# Patient Record
Sex: FEMALE | Race: WHITE | Employment: OTHER | URBAN - METROPOLITAN AREA
[De-identification: names, ages, dates, MRNs, and addresses within clinical notes are randomized per-mention and may not be internally consistent; named-entity substitution may affect disease eponyms.]

---

## 2017-03-08 ENCOUNTER — OFFICE VISIT (OUTPATIENT)
Dept: FAMILY MEDICINE CLINIC | Facility: CLINIC | Age: 57
End: 2017-03-08

## 2017-03-08 VITALS
HEART RATE: 92 BPM | DIASTOLIC BLOOD PRESSURE: 78 MMHG | BODY MASS INDEX: 24.93 KG/M2 | RESPIRATION RATE: 18 BRPM | WEIGHT: 147.81 LBS | HEIGHT: 64.5 IN | SYSTOLIC BLOOD PRESSURE: 110 MMHG | TEMPERATURE: 97 F

## 2017-03-08 DIAGNOSIS — J02.9 SORE THROAT: Primary | ICD-10-CM

## 2017-03-08 DIAGNOSIS — K12.0 ULCER APHTHOUS ORAL: ICD-10-CM

## 2017-03-08 LAB
CONTROL LINE PRESENT WITH A CLEAR BACKGROUND (YES/NO): YES YES/NO
STREP GRP A CUL-SCR: NEGATIVE

## 2017-03-08 PROCEDURE — 99213 OFFICE O/P EST LOW 20 MIN: CPT | Performed by: FAMILY MEDICINE

## 2017-03-08 PROCEDURE — 87081 CULTURE SCREEN ONLY: CPT | Performed by: FAMILY MEDICINE

## 2017-03-08 PROCEDURE — 87880 STREP A ASSAY W/OPTIC: CPT | Performed by: FAMILY MEDICINE

## 2017-03-08 RX ORDER — CHLORHEXIDINE GLUCONATE 0.12 MG/ML
15 RINSE ORAL 2 TIMES DAILY
COMMUNITY
End: 2017-09-12 | Stop reason: ALTCHOICE

## 2017-03-08 RX ORDER — CHOLECALCIFEROL (VITAMIN D3) 1250 MCG
50000 CAPSULE ORAL
COMMUNITY
End: 2017-05-01

## 2017-03-08 RX ORDER — CLOBETASOL PROPIONATE 0.5 MG/G
OINTMENT TOPICAL WEEKLY
COMMUNITY

## 2017-03-08 RX ORDER — METRONIDAZOLE 250 MG/1
250 TABLET ORAL 3 TIMES DAILY
COMMUNITY
End: 2017-03-15 | Stop reason: ALTCHOICE

## 2017-03-08 RX ORDER — FLUOXETINE 10 MG/1
20 TABLET, FILM COATED ORAL DAILY
COMMUNITY
End: 2017-03-15 | Stop reason: ALTCHOICE

## 2017-03-09 NOTE — PROGRESS NOTES
[facility] [place]  PROGRESS NOTE  Chief Complaint:   Patient presents with:  Sore Throat: Per dentist suggested to get strep test    HPI:   This is a 49year old female coming in for eval of throat  HPI Pt had gum surgery2/22- on karla Wilson Gluconate 0.12 % Mouth/Throat Solution Use as directed 15 mL in the mouth or throat 2 (two) times daily. Disp:  Rfl:    metRONIDAZOLE 250 MG Oral Tab Take 250 mg by mouth 3 (three) times daily.  Disp:  Rfl:       Counseling given: Not Answered       REVIEW MONITORING. RECHECK OR ANTIBIOTIC IF WORSENS OR POSITIVE THROAT CULTURE. There are no Patient Instructions on file for this visit.   Meds & Refills for this Visit:    No prescriptions requested or ordered in this encounter           Patient/Caregiver Ed

## 2017-03-10 ENCOUNTER — TELEPHONE (OUTPATIENT)
Dept: FAMILY MEDICINE CLINIC | Facility: CLINIC | Age: 57
End: 2017-03-10

## 2017-03-14 ENCOUNTER — TELEPHONE (OUTPATIENT)
Dept: FAMILY MEDICINE CLINIC | Facility: CLINIC | Age: 57
End: 2017-03-14

## 2017-03-14 NOTE — TELEPHONE ENCOUNTER
As above. Patient advised to be here 8:45 Wed 3/15 for PreOp Appt.   Mary Cloud, 03/14/2017, 11:16 AM

## 2017-03-15 ENCOUNTER — OFFICE VISIT (OUTPATIENT)
Dept: FAMILY MEDICINE CLINIC | Facility: CLINIC | Age: 57
End: 2017-03-15

## 2017-03-15 ENCOUNTER — LAB ENCOUNTER (OUTPATIENT)
Dept: LAB | Age: 57
End: 2017-03-15
Attending: FAMILY MEDICINE
Payer: COMMERCIAL

## 2017-03-15 ENCOUNTER — TELEPHONE (OUTPATIENT)
Dept: FAMILY MEDICINE CLINIC | Facility: CLINIC | Age: 57
End: 2017-03-15

## 2017-03-15 VITALS
HEIGHT: 64.5 IN | HEART RATE: 88 BPM | SYSTOLIC BLOOD PRESSURE: 144 MMHG | DIASTOLIC BLOOD PRESSURE: 88 MMHG | WEIGHT: 146.5 LBS | BODY MASS INDEX: 24.71 KG/M2 | RESPIRATION RATE: 16 BRPM | TEMPERATURE: 97 F

## 2017-03-15 DIAGNOSIS — K06.9 CHRONIC GUM DISEASE: ICD-10-CM

## 2017-03-15 DIAGNOSIS — E55.9 VITAMIN D DEFICIENCY: ICD-10-CM

## 2017-03-15 DIAGNOSIS — M81.0 OSTEOPOROSIS: ICD-10-CM

## 2017-03-15 DIAGNOSIS — F32.A DEPRESSION, UNSPECIFIED DEPRESSION TYPE: ICD-10-CM

## 2017-03-15 DIAGNOSIS — M67.40 MUCOID CYST OF JOINT: ICD-10-CM

## 2017-03-15 DIAGNOSIS — F41.1 ANXIETY STATE: ICD-10-CM

## 2017-03-15 DIAGNOSIS — Z01.818 PREOPERATIVE EXAMINATION: ICD-10-CM

## 2017-03-15 DIAGNOSIS — Z01.818 PREOPERATIVE EXAMINATION: Primary | ICD-10-CM

## 2017-03-15 LAB
ALBUMIN SERPL-MCNC: 3.8 G/DL (ref 3.5–4.8)
ALP LIVER SERPL-CCNC: 75 U/L (ref 41–108)
ALT SERPL-CCNC: 23 U/L (ref 14–54)
AST SERPL-CCNC: 14 U/L (ref 15–41)
BASOPHILS # BLD AUTO: 0.05 X10(3) UL (ref 0–0.1)
BASOPHILS NFR BLD AUTO: 1.1 %
BILIRUB SERPL-MCNC: 0.3 MG/DL (ref 0.1–2)
BUN BLD-MCNC: 17 MG/DL (ref 8–20)
CALCIUM BLD-MCNC: 9.4 MG/DL (ref 8.3–10.3)
CHLORIDE: 104 MMOL/L (ref 101–111)
CHOLEST SMN-MCNC: 216 MG/DL (ref ?–200)
CO2: 29 MMOL/L (ref 22–32)
CREAT BLD-MCNC: 0.88 MG/DL (ref 0.55–1.02)
EOSINOPHIL # BLD AUTO: 0 X10(3) UL (ref 0–0.3)
EOSINOPHIL NFR BLD AUTO: 0 %
ERYTHROCYTE [DISTWIDTH] IN BLOOD BY AUTOMATED COUNT: 14.3 % (ref 11.5–16)
GLUCOSE BLD-MCNC: 80 MG/DL (ref 70–99)
HCT VFR BLD AUTO: 43.1 % (ref 34–50)
HDLC SERPL-MCNC: 42 MG/DL (ref 45–?)
HDLC SERPL: 5.14 {RATIO} (ref ?–4.44)
HGB BLD-MCNC: 14.1 G/DL (ref 12–16)
IMMATURE GRANULOCYTE COUNT: 0.01 X10(3) UL (ref 0–1)
IMMATURE GRANULOCYTE RATIO %: 0.2 %
LDLC SERPL CALC-MCNC: 151 MG/DL (ref ?–130)
LYMPHOCYTES # BLD AUTO: 2.03 X10(3) UL (ref 0.9–4)
LYMPHOCYTES NFR BLD AUTO: 43 %
M PROTEIN MFR SERPL ELPH: 7.5 G/DL (ref 6.1–8.3)
MCH RBC QN AUTO: 29.6 PG (ref 27–33.2)
MCHC RBC AUTO-ENTMCNC: 32.7 G/DL (ref 31–37)
MCV RBC AUTO: 90.4 FL (ref 81–100)
MONOCYTES # BLD AUTO: 0.55 X10(3) UL (ref 0.1–0.6)
MONOCYTES NFR BLD AUTO: 11.7 %
NEUTROPHIL ABS PRELIM: 2.08 X10 (3) UL (ref 1.3–6.7)
NEUTROPHILS # BLD AUTO: 2.08 X10(3) UL (ref 1.3–6.7)
NEUTROPHILS NFR BLD AUTO: 44 %
NONHDLC SERPL-MCNC: 174 MG/DL (ref ?–130)
PLATELET # BLD AUTO: 366 10(3)UL (ref 150–450)
POTASSIUM SERPL-SCNC: 4.1 MMOL/L (ref 3.6–5.1)
RBC # BLD AUTO: 4.77 X10(6)UL (ref 3.8–5.1)
RED CELL DISTRIBUTION WIDTH-SD: 47.5 FL (ref 35.1–46.3)
SODIUM SERPL-SCNC: 142 MMOL/L (ref 136–144)
TRIGLYCERIDES: 117 MG/DL (ref ?–150)
TSI SER-ACNC: 1.47 MIU/ML (ref 0.35–5.5)
URIC ACID: 3.5 MG/DL (ref 2.4–8)
VLDL: 23 MG/DL (ref 5–40)
WBC # BLD AUTO: 4.7 X10(3) UL (ref 4–13)

## 2017-03-15 PROCEDURE — 84443 ASSAY THYROID STIM HORMONE: CPT

## 2017-03-15 PROCEDURE — 84550 ASSAY OF BLOOD/URIC ACID: CPT

## 2017-03-15 PROCEDURE — 93000 ELECTROCARDIOGRAM COMPLETE: CPT | Performed by: FAMILY MEDICINE

## 2017-03-15 PROCEDURE — 80053 COMPREHEN METABOLIC PANEL: CPT

## 2017-03-15 PROCEDURE — 80061 LIPID PANEL: CPT

## 2017-03-15 PROCEDURE — 85025 COMPLETE CBC W/AUTO DIFF WBC: CPT

## 2017-03-15 PROCEDURE — 99214 OFFICE O/P EST MOD 30 MIN: CPT | Performed by: FAMILY MEDICINE

## 2017-03-15 RX ORDER — ESTRADIOL 0.1 MG/G
1 CREAM VAGINAL AS DIRECTED
COMMUNITY

## 2017-03-15 RX ORDER — FLUOXETINE HYDROCHLORIDE 20 MG/1
1 CAPSULE ORAL DAILY
COMMUNITY
End: 2017-09-12

## 2017-03-15 NOTE — TELEPHONE ENCOUNTER
Patient informed of Dr Sue Russell documentation below. Expressed understanding.   Philip Quintana, 03/15/2017, 6:44 PM

## 2017-03-15 NOTE — H&P
[facility] [place]  PRE-OP NOTE    HPI:   Melanie Palencia is a 49year old female with a hx of mucoid cyst, who presents for a pre-operative physical exam. Patient is to have excision of the mucoid cyst, to by done by Dr. Casilda Halsted at [facility] daily. Disp:  Rfl:      Past Medical History   Diagnosis Date   • Allergic rhinitis    • Anxiety    • Depression    • Menopausal and female climacteric states    • Frequent UTI    • Esophageal atresia          Past Surgical History          COLONOSCOPY enlarged nodes or history of splenectomy. PSYCHIATRIC: She is a long-standing history of depression. She is doing relatively well at present but wonders about restarting her fluoxetine.   ENDOCRINOLOGIC:  Denies excessive sweating, cold or heat intoleranc intact.     ASSESSMENT AND PLAN:   Ashley Curtis is a 49year old female, with a hx of mucoid cyst, who presents for a pre-operative  physical exam. Patient is to have removal of the mucoid cyst, to by done by Dr. Adán Melara at [place] [address] on 3

## 2017-03-15 NOTE — TELEPHONE ENCOUNTER
----- Message from Madeline Fournier MD sent at 3/15/2017  5:58 PM CDT -----  Good news - labs look good. Cholesterol is mildly elevated at 216; watch diet carefully.   Chemistry profile is normal.  Thyroid is normal.  Blood count is normal.  OK for surger

## 2017-03-20 ENCOUNTER — TELEPHONE (OUTPATIENT)
Dept: FAMILY MEDICINE CLINIC | Facility: CLINIC | Age: 57
End: 2017-03-20

## 2017-03-20 ENCOUNTER — MED REC SCAN ONLY (OUTPATIENT)
Dept: FAMILY MEDICINE CLINIC | Facility: CLINIC | Age: 57
End: 2017-03-20

## 2017-05-01 ENCOUNTER — PATIENT MESSAGE (OUTPATIENT)
Dept: FAMILY MEDICINE CLINIC | Facility: CLINIC | Age: 57
End: 2017-05-01

## 2017-05-01 RX ORDER — CHOLECALCIFEROL (VITAMIN D3) 1250 MCG
50000 CAPSULE ORAL
Qty: 7 CAPSULE | Refills: 3 | Status: SHIPPED | OUTPATIENT
Start: 2017-05-01 | End: 2018-06-06

## 2017-05-01 NOTE — TELEPHONE ENCOUNTER
----- Message from [address] [place] [address] [address] Generic sent at 5/1/2017 10:46 AM CDT -----  Regarding: Prescription Question  Contact: [phone]  I need a refill on my VIT D 50,000 IU D2 CAPS Rx. Please use [facility] in [place] [phone]. Thank you!

## 2017-09-12 ENCOUNTER — APPOINTMENT (OUTPATIENT)
Dept: LAB | Age: 57
End: 2017-09-12
Attending: FAMILY MEDICINE
Payer: COMMERCIAL

## 2017-09-12 ENCOUNTER — OFFICE VISIT (OUTPATIENT)
Dept: FAMILY MEDICINE CLINIC | Facility: CLINIC | Age: 57
End: 2017-09-12

## 2017-09-12 VITALS
SYSTOLIC BLOOD PRESSURE: 138 MMHG | RESPIRATION RATE: 14 BRPM | TEMPERATURE: 97 F | BODY MASS INDEX: 23.95 KG/M2 | HEIGHT: 64 IN | WEIGHT: 140.25 LBS | DIASTOLIC BLOOD PRESSURE: 82 MMHG | HEART RATE: 68 BPM

## 2017-09-12 DIAGNOSIS — Z00.00 HEALTHCARE MAINTENANCE: Primary | ICD-10-CM

## 2017-09-12 DIAGNOSIS — K58.2 IRRITABLE BOWEL SYNDROME WITH BOTH CONSTIPATION AND DIARRHEA: ICD-10-CM

## 2017-09-12 DIAGNOSIS — B00.9 HERPES SIMPLEX: ICD-10-CM

## 2017-09-12 DIAGNOSIS — F41.9 ANXIETY: ICD-10-CM

## 2017-09-12 DIAGNOSIS — F32.A DEPRESSION, UNSPECIFIED DEPRESSION TYPE: ICD-10-CM

## 2017-09-12 DIAGNOSIS — N90.4 LICHEN SCLEROSUS OF FEMALE GENITALIA: ICD-10-CM

## 2017-09-12 LAB
ALBUMIN SERPL-MCNC: 3.9 G/DL (ref 3.5–4.8)
ALP LIVER SERPL-CCNC: 71 U/L (ref 41–108)
ALT SERPL-CCNC: 20 U/L (ref 14–54)
AST SERPL-CCNC: 14 U/L (ref 15–41)
BASOPHILS # BLD AUTO: 0.05 X10(3) UL (ref 0–0.1)
BASOPHILS NFR BLD AUTO: 1.3 %
BILIRUB SERPL-MCNC: 0.7 MG/DL (ref 0.1–2)
BUN BLD-MCNC: 13 MG/DL (ref 8–20)
CALCIUM BLD-MCNC: 9 MG/DL (ref 8.3–10.3)
CHLORIDE: 105 MMOL/L (ref 101–111)
CHOLEST SMN-MCNC: 215 MG/DL (ref ?–200)
CO2: 29 MMOL/L (ref 22–32)
CREAT BLD-MCNC: 0.83 MG/DL (ref 0.55–1.02)
EOSINOPHIL # BLD AUTO: 0 X10(3) UL (ref 0–0.3)
EOSINOPHIL NFR BLD AUTO: 0 %
ERYTHROCYTE [DISTWIDTH] IN BLOOD BY AUTOMATED COUNT: 14.9 % (ref 11.5–16)
GLUCOSE BLD-MCNC: 86 MG/DL (ref 70–99)
HCT VFR BLD AUTO: 44.4 % (ref 34–50)
HDLC SERPL-MCNC: 54 MG/DL (ref 45–?)
HDLC SERPL: 3.98 {RATIO} (ref ?–4.44)
HGB BLD-MCNC: 14.3 G/DL (ref 12–16)
IMMATURE GRANULOCYTE COUNT: 0.01 X10(3) UL (ref 0–1)
IMMATURE GRANULOCYTE RATIO %: 0.3 %
LDLC SERPL CALC-MCNC: 143 MG/DL (ref ?–130)
LDLC SERPL-MCNC: 18 MG/DL (ref 5–40)
LYMPHOCYTES # BLD AUTO: 1.64 X10(3) UL (ref 0.9–4)
LYMPHOCYTES NFR BLD AUTO: 41.7 %
M PROTEIN MFR SERPL ELPH: 7.5 G/DL (ref 6.1–8.3)
MCH RBC QN AUTO: 28.8 PG (ref 27–33.2)
MCHC RBC AUTO-ENTMCNC: 32.2 G/DL (ref 31–37)
MCV RBC AUTO: 89.5 FL (ref 81–100)
MONOCYTES # BLD AUTO: 0.56 X10(3) UL (ref 0.1–0.6)
MONOCYTES NFR BLD AUTO: 14.2 %
NEUTROPHIL ABS PRELIM: 1.67 X10 (3) UL (ref 1.3–6.7)
NEUTROPHILS # BLD AUTO: 1.67 X10(3) UL (ref 1.3–6.7)
NEUTROPHILS NFR BLD AUTO: 42.5 %
NONHDLC SERPL-MCNC: 161 MG/DL (ref ?–130)
PLATELET # BLD AUTO: 293 10(3)UL (ref 150–450)
POTASSIUM SERPL-SCNC: 4 MMOL/L (ref 3.6–5.1)
RBC # BLD AUTO: 4.96 X10(6)UL (ref 3.8–5.1)
RED CELL DISTRIBUTION WIDTH-SD: 48.4 FL (ref 35.1–46.3)
SODIUM SERPL-SCNC: 140 MMOL/L (ref 136–144)
TRIGLYCERIDES: 92 MG/DL (ref ?–150)
TSI SER-ACNC: 1.15 MIU/ML (ref 0.35–5.5)
WBC # BLD AUTO: 3.9 X10(3) UL (ref 4–13)

## 2017-09-12 PROCEDURE — 80061 LIPID PANEL: CPT | Performed by: FAMILY MEDICINE

## 2017-09-12 PROCEDURE — 36415 COLL VENOUS BLD VENIPUNCTURE: CPT | Performed by: FAMILY MEDICINE

## 2017-09-12 PROCEDURE — 99396 PREV VISIT EST AGE 40-64: CPT | Performed by: FAMILY MEDICINE

## 2017-09-12 PROCEDURE — 99214 OFFICE O/P EST MOD 30 MIN: CPT | Performed by: FAMILY MEDICINE

## 2017-09-12 PROCEDURE — 80050 GENERAL HEALTH PANEL: CPT | Performed by: FAMILY MEDICINE

## 2017-09-12 RX ORDER — ACYCLOVIR 50 MG/G
OINTMENT TOPICAL
Qty: 15 G | Refills: 5 | Status: SHIPPED | OUTPATIENT
Start: 2017-09-12

## 2017-09-12 RX ORDER — DIAZEPAM 5 MG/1
5 TABLET ORAL EVERY 6 HOURS PRN
Qty: 10 TABLET | Refills: 1 | Status: SHIPPED | OUTPATIENT
Start: 2017-09-12 | End: 2018-10-05

## 2017-09-12 RX ORDER — FLUOXETINE HYDROCHLORIDE 20 MG/1
20 CAPSULE ORAL DAILY
Qty: 90 CAPSULE | Refills: 3 | Status: SHIPPED | OUTPATIENT
Start: 2017-09-12 | End: 2018-10-05

## 2017-09-12 NOTE — PROGRESS NOTES
[place] [facility] [place]  PROGRESS NOTE  Chief Complaint:   Patient presents with:  Physical: Sees gyne      HPI:   This is a 56year old female coming in for her annual physical.  She said that she has been feeling pretty good overall.   She denies an MCH 29.6 27.0 - 33.2 pg   MCHC 32.7 31.0 - 37.0 g/dL   RDW 14.3 11.5 - 16.0 %   RDW-SD 47.5 (H) 35.1 - 46.3 fL   Neutrophil Absolute Prelim 2.08 1.30 - 6.70 x10 (3) uL   Neutrophil Absolute 2.08 1.30 - 6.70 x10(3) uL   Lymphocyte Absolute 2.03 0.90 - 4. 0 Vaginal Cream Place 1 g vaginally As Directed. Insert 1g vaginally 2x Weekly Disp:  Rfl:    Clobetasol Propionate 0.05 % External Ointment Apply topically once a week.  Disp:  Rfl:       Counseling given: Not Answered       REVIEW OF SYSTEMS:   CONSTITUTION reviewed. Appears stated age, well groomed. Physical Exam:  GEN:  Patient is alert, awake and oriented, well developed, well nourished, no apparent distress.   HEENT:  Head:  Normocephalic, atraumatic Eyes: EOMI, PERRLA, no scleral icterus, conjunctivae jessica COMP METABOLIC PANEL (14); Future  - LIPID PANEL; Future  - ASSAY, THYROID STIM HORMONE; Future  - CBC WITH DIFFERENTIAL WITH PLATELET  - COMP METABOLIC PANEL (14)  - LIPID PANEL  - ASSAY, THYROID STIM HORMONE  - CBC W/ DIFFERENTIAL    4.  Irritable bowel s Hematochezia     Osteoporosis     Vitamin D deficiency     Preoperative examination     Mucoid cyst of joint     Chronic gum disease     Lichen sclerosus of female genitalia     Irritable bowel syndrome (IBS)     Herpes simplex     Healthcare maintenance

## 2017-10-11 ENCOUNTER — MED REC SCAN ONLY (OUTPATIENT)
Dept: FAMILY MEDICINE CLINIC | Facility: CLINIC | Age: 57
End: 2017-10-11

## 2018-06-07 NOTE — TELEPHONE ENCOUNTER
Future appt:     Last Appointment:  9/12/2017; Return in about 6 months (around 3/12/2018).        Cholesterol, Total (mg/dL)   Date Value   09/12/2017 215 (H)   ----------  HDL Cholesterol (mg/dL)   Date Value   09/12/2017 54   ----------  LDL Cholesterol

## 2018-06-08 RX ORDER — CHOLECALCIFEROL (VITAMIN D3) 1250 MCG
CAPSULE ORAL
Qty: 7 CAPSULE | Refills: 0 | Status: SHIPPED | OUTPATIENT
Start: 2018-06-08

## 2018-06-13 ENCOUNTER — MED REC SCAN ONLY (OUTPATIENT)
Dept: FAMILY MEDICINE CLINIC | Facility: CLINIC | Age: 58
End: 2018-06-13

## 2018-10-05 ENCOUNTER — OFFICE VISIT (OUTPATIENT)
Dept: FAMILY MEDICINE CLINIC | Facility: CLINIC | Age: 58
End: 2018-10-05
Payer: COMMERCIAL

## 2018-10-05 ENCOUNTER — APPOINTMENT (OUTPATIENT)
Dept: LAB | Age: 58
End: 2018-10-05
Attending: FAMILY MEDICINE
Payer: COMMERCIAL

## 2018-10-05 VITALS
RESPIRATION RATE: 16 BRPM | WEIGHT: 141.38 LBS | BODY MASS INDEX: 24.14 KG/M2 | HEART RATE: 70 BPM | TEMPERATURE: 96 F | SYSTOLIC BLOOD PRESSURE: 138 MMHG | HEIGHT: 64 IN | DIASTOLIC BLOOD PRESSURE: 98 MMHG

## 2018-10-05 DIAGNOSIS — N90.4 LICHEN SCLEROSUS OF FEMALE GENITALIA: ICD-10-CM

## 2018-10-05 DIAGNOSIS — F41.9 ANXIETY: ICD-10-CM

## 2018-10-05 DIAGNOSIS — F33.42 RECURRENT MAJOR DEPRESSIVE DISORDER, IN FULL REMISSION (HCC): ICD-10-CM

## 2018-10-05 DIAGNOSIS — K58.2 IRRITABLE BOWEL SYNDROME WITH BOTH CONSTIPATION AND DIARRHEA: ICD-10-CM

## 2018-10-05 DIAGNOSIS — E55.9 VITAMIN D DEFICIENCY: ICD-10-CM

## 2018-10-05 DIAGNOSIS — M81.0 AGE-RELATED OSTEOPOROSIS WITHOUT CURRENT PATHOLOGICAL FRACTURE: ICD-10-CM

## 2018-10-05 DIAGNOSIS — K59.09 CHRONIC CONSTIPATION: ICD-10-CM

## 2018-10-05 DIAGNOSIS — Z00.00 HEALTHCARE MAINTENANCE: Primary | ICD-10-CM

## 2018-10-05 PROCEDURE — 99213 OFFICE O/P EST LOW 20 MIN: CPT | Performed by: FAMILY MEDICINE

## 2018-10-05 PROCEDURE — 80061 LIPID PANEL: CPT | Performed by: FAMILY MEDICINE

## 2018-10-05 PROCEDURE — 36415 COLL VENOUS BLD VENIPUNCTURE: CPT | Performed by: FAMILY MEDICINE

## 2018-10-05 PROCEDURE — 80050 GENERAL HEALTH PANEL: CPT | Performed by: FAMILY MEDICINE

## 2018-10-05 PROCEDURE — 82306 VITAMIN D 25 HYDROXY: CPT | Performed by: FAMILY MEDICINE

## 2018-10-05 PROCEDURE — 84550 ASSAY OF BLOOD/URIC ACID: CPT | Performed by: FAMILY MEDICINE

## 2018-10-05 PROCEDURE — 99396 PREV VISIT EST AGE 40-64: CPT | Performed by: FAMILY MEDICINE

## 2018-10-05 RX ORDER — DIAZEPAM 5 MG/1
5 TABLET ORAL EVERY 6 HOURS PRN
Qty: 10 TABLET | Refills: 1 | Status: SHIPPED | OUTPATIENT
Start: 2018-10-05 | End: 2019-04-07

## 2018-10-05 NOTE — PROGRESS NOTES
[facility] [place]  PROGRESS NOTE  Chief Complaint:   Patient presents with:  Physical      HPI:   This is a 66year old female coming in for her annual wellness visit. She stopped taking her fluoxetine about 6 weeks ago.   She has done relati 140 136 - 144 mmol/L    Potassium 4.0 3.6 - 5.1 mmol/L    Chloride 105 101 - 111 mmol/L    CO2 29.0 22.0 - 32.0 mmol/L   LIPID PANEL   Result Value Ref Range    Cholesterol, Total 215 (H) <200 mg/dL    Triglycerides 92 <150 mg/dL    HDL Cholesterol 54 >45 Relation Age of Onset   • Lipids Mother    • Other (Other[other]) Mother      Allergies:    Venlafaxine             NAUSEA AND VOMITING  Penicillin G            RASH  Penicillins               Current Meds:    Current Outpatient Medications:  diazepam 5 MG intolerance, polyuria or polydipsia. ALLERGIES:  Denies allergic response, history of asthma, sneezing, hives, eczema or rhinitis.      EXAM:   BP (!) 138/98 (BP Location: Left arm, Patient Position: Sitting, Cuff Size: adult)   Pulse 70   Temp (!) 96.4 °F Age-related osteoporosis without current pathological fracture  She does have osteoporosis. It does run in her family. She is due for a DEXA scan. She will schedule that. - CBC WITH DIFFERENTIAL WITH PLATELET; Future  - COMP METABOLIC PANEL (14);  Futur scan and a mammogram.  Recheck in 1 year.     Meds & Refills for this Visit:  Requested Prescriptions     Signed Prescriptions Disp Refills   • diazepam 5 MG Oral Tab 10 tablet 1     Sig: Take 1 tablet (5 mg total) by mouth every 6 (six) hours as needed for

## 2018-10-06 ENCOUNTER — TELEPHONE (OUTPATIENT)
Dept: FAMILY MEDICINE CLINIC | Facility: CLINIC | Age: 58
End: 2018-10-06

## 2018-10-06 NOTE — TELEPHONE ENCOUNTER
----- Message from Leonidas Low MD sent at 10/6/2018  7:59 AM CDT -----  Good news. The blood work is completely normal.  Vitamin D level is 55 which is excellent. Please keep taking the vitamin D supplement.   Blood sugar is normal so no signs of di

## 2018-10-16 ENCOUNTER — TELEPHONE (OUTPATIENT)
Dept: FAMILY MEDICINE CLINIC | Facility: CLINIC | Age: 58
End: 2018-10-16

## 2018-10-16 DIAGNOSIS — M81.0 AGE-RELATED OSTEOPOROSIS WITHOUT CURRENT PATHOLOGICAL FRACTURE: ICD-10-CM

## 2018-10-16 DIAGNOSIS — E55.9 VITAMIN D DEFICIENCY: Primary | ICD-10-CM

## 2018-10-16 NOTE — TELEPHONE ENCOUNTER
Patient would like to schedule a bone density, needs orders. Patient scheduled a mammo on 10/19/18 at 10:00am if possible patient would like to schedule the bone density for that same day.

## 2018-10-19 ENCOUNTER — TELEPHONE (OUTPATIENT)
Dept: FAMILY MEDICINE CLINIC | Facility: CLINIC | Age: 58
End: 2018-10-19

## 2018-10-19 ENCOUNTER — HOSPITAL ENCOUNTER (OUTPATIENT)
Dept: BONE DENSITY | Age: 58
Discharge: HOME | End: 2018-10-19
Attending: FAMILY MEDICINE
Payer: COMMERCIAL

## 2018-10-19 ENCOUNTER — HOSPITAL ENCOUNTER (OUTPATIENT)
Dept: MAMMOGRAPHY | Age: 58
Discharge: HOME | End: 2018-10-19
Attending: FAMILY MEDICINE
Payer: COMMERCIAL

## 2018-10-19 DIAGNOSIS — M81.0 AGE-RELATED OSTEOPOROSIS WITHOUT CURRENT PATHOLOGICAL FRACTURE: ICD-10-CM

## 2018-10-19 DIAGNOSIS — Z12.39 BREAST CANCER SCREENING: ICD-10-CM

## 2018-10-19 DIAGNOSIS — E55.9 VITAMIN D DEFICIENCY: ICD-10-CM

## 2018-10-19 PROCEDURE — 77067 SCR MAMMO BI INCL CAD: CPT | Performed by: FAMILY MEDICINE

## 2018-10-19 PROCEDURE — 77080 DXA BONE DENSITY AXIAL: CPT | Performed by: FAMILY MEDICINE

## 2018-10-19 PROCEDURE — 77063 BREAST TOMOSYNTHESIS BI: CPT | Performed by: FAMILY MEDICINE

## 2018-10-19 NOTE — TELEPHONE ENCOUNTER
----- Message from Kaushal Reyes MD sent at 10/19/2018  4:28 PM CDT -----  Right breast is normal.  Left breast needs additional views. Please schedule an appointment for these additional views.

## 2018-10-19 NOTE — TELEPHONE ENCOUNTER
I spoke with Aldo Wolf. Her old record talked about Prolixin injectable. Apparently was ordered but she never actually took the injection. Her DEXA scores are slowly worsening.   She will do additional research and see if there is something else she would co

## 2018-10-21 ENCOUNTER — PATIENT MESSAGE (OUTPATIENT)
Dept: FAMILY MEDICINE CLINIC | Facility: CLINIC | Age: 58
End: 2018-10-21

## 2018-10-22 ENCOUNTER — TELEPHONE (OUTPATIENT)
Dept: FAMILY MEDICINE CLINIC | Facility: CLINIC | Age: 58
End: 2018-10-22

## 2018-10-22 DIAGNOSIS — R92.8 ABNORMAL MAMMOGRAM: Primary | ICD-10-CM

## 2018-10-22 NOTE — TELEPHONE ENCOUNTER
Pt does not want to do f/u mammo in [place]. Can pt have diagnotic mammo done at [facility]? Please place order for diagnotic mammo and US. Please advise. Pt's mailbox is full.

## 2018-10-22 NOTE — TELEPHONE ENCOUNTER
Patient states in 2015 She had an Abnormal Mammogram Left Breast Left Breast.  Additional Views were also done at [facility]. Patient now has the breast imaging from 2016-Present.   Patient is wanting to go to [facility] for current additiona

## 2018-10-22 NOTE — TELEPHONE ENCOUNTER
would like to speak with Dr Reynaldo Siddiqi about  Mammo   snafu. .       does not want to go to [place] / needs orders and last 3 yr Mammo reports for radiologist.

## 2018-10-22 NOTE — PROGRESS NOTES
Kellen Zhao was wrong. The additional views require the presence of the radiologist to look at the abnormal area and to direct the additional testing to a specific spot. Unfortunately we do not have the radiologist on site here.   As a result you do have to

## 2018-10-22 NOTE — TELEPHONE ENCOUNTER
From: Deepak Cable  To:  Rissa Campuzano MD  Sent: 10/21/2018 7:48 PM CDT  Subject: Visit Follow-up Question    After out discussion on Friday evening I called the [address] (Saturday Oct 20th) and tried to make an appointment for the addition

## 2018-10-23 RX ORDER — FLUOXETINE HYDROCHLORIDE 20 MG/1
CAPSULE ORAL
Qty: 90 CAPSULE | Refills: 3 | OUTPATIENT
Start: 2018-10-23

## 2018-10-23 NOTE — TELEPHONE ENCOUNTER
Please call patient and ask if she is still taking this medication or if she stopped it. If she is wanting to restart, we need to go to a lower dose.

## 2018-10-30 ENCOUNTER — TELEPHONE (OUTPATIENT)
Dept: FAMILY MEDICINE CLINIC | Facility: CLINIC | Age: 58
End: 2018-10-30

## 2018-10-30 DIAGNOSIS — N63.0 BREAST LUMP: Primary | ICD-10-CM

## 2018-10-30 NOTE — TELEPHONE ENCOUNTER
Aishwarya Mckenzie requesting Mammogram to say Diagnostic Mammogram vs Additional views faxed. Please advise.   Srinivasa Breen, 10/30/18, 2:17 PM

## 2018-11-01 ENCOUNTER — TELEPHONE (OUTPATIENT)
Dept: FAMILY MEDICINE CLINIC | Facility: CLINIC | Age: 58
End: 2018-11-01

## 2018-11-01 DIAGNOSIS — R92.8 ABNORMAL MAMMOGRAM OF LEFT BREAST: Primary | ICD-10-CM

## 2018-11-01 NOTE — TELEPHONE ENCOUNTER
Needs new order to bilateral diagnostic mammo, left U/S  Fax # [phone].  Having done tomorrow am at 8 am

## 2018-11-01 NOTE — TELEPHONE ENCOUNTER
Dr. Susanne Black is out of the office today. Orders signed. Please fax to [facility]. Thank you.

## 2018-11-01 NOTE — TELEPHONE ENCOUNTER
Per Hannah, they are needing 2 different orders:    1. Bilateral diagnostic mammogram (because patient hasn't been there in 2 years)  2. U/S L breast    Please advise.

## 2018-11-13 ENCOUNTER — MED REC SCAN ONLY (OUTPATIENT)
Dept: FAMILY MEDICINE CLINIC | Facility: CLINIC | Age: 58
End: 2018-11-13

## 2018-11-15 ENCOUNTER — TELEPHONE (OUTPATIENT)
Dept: FAMILY MEDICINE CLINIC | Facility: CLINIC | Age: 58
End: 2018-11-15

## 2018-11-16 NOTE — TELEPHONE ENCOUNTER
Patient had Additional Mammogram Views done at [facility] due to previous  Abnormal Mammogram Additional Views done there. [facility] had all the old images there. Patient did not want to repeat additional views with [facility].   Message sent t

## 2019-02-13 ENCOUNTER — TELEPHONE (OUTPATIENT)
Dept: FAMILY MEDICINE CLINIC | Facility: CLINIC | Age: 59
End: 2019-02-13

## 2019-02-13 NOTE — TELEPHONE ENCOUNTER
Patient states She is having increasing IBS Sx. Involving loose stools. Every other week or so. Patient taking an antispasmotic and imodium to help with sx. States she was on fluoxitine in the past that she has weaned herself from.   States this [employer]

## 2019-02-26 NOTE — PROGRESS NOTES
[facility] [place]  PROGRESS NOTE  Chief Complaint:   Patient presents with:  Irritable Bowel      HPI:   This is a 66year old female coming in for cramping and severe abdominal pain.     She said that since the middle of December her stress lev decreased her libido. Fluoxetine made her thinking slightly foggy. She felt like she was less able to tackle problems or issues when she was taking it. She has a bottle of hyoscyamine from a prescription back in 2014.   She has been taking those to hel - 450.0 10(3)uL    MCV 90.1 81.0 - 100.0 fL    MCH 28.8 27.0 - 33.2 pg    MCHC 31.9 31.0 - 37.0 g/dL    RDW 14.5 11.5 - 16.0 %    RDW-SD 48.1 (H) 35.1 - 46.3 fL    Neutrophil Absolute Prelim 2.83 1.30 - 6.70 x10 (3) uL    Neutrophil Absolute 2.83 1.30 - 6. hours as needed for Anxiety.  Disp: 10 tablet Rfl: 1   VITAMIN D3 01927 units Oral Cap TAKE 1 CAPSULE BY MOUTH EVERY 14 DAYS Disp: 7 capsule Rfl: 0   acyclovir 5 % External Ointment Apply to affected area five times a day Disp: 15 g Rfl: 5   Estradiol (ESTR 23.07 kg/m² as calculated from the following:    Height as of this encounter: 64\". Weight as of this encounter: 134 lb 6.4 oz. Vital signs reviewed. Appears stated age, well groomed.   Physical Exam:  GEN:  Patient is alert, awake and oriented, well and the anxiety will help with the irritable bowel. 5. Achalasia of esophagus  She has a history of achalasia of the esophagus. It is not causing her a lot of trouble now.       Meds & Refills for this Visit:  Requested Prescriptions     Signed Prescrip

## 2019-04-08 RX ORDER — DIAZEPAM 5 MG/1
TABLET ORAL
Qty: 10 TABLET | Refills: 0 | Status: SHIPPED
Start: 2019-04-08 | End: 2020-12-08

## 2019-04-08 NOTE — TELEPHONE ENCOUNTER
Future appt:     Your appointments     Date & Time Appointment Department [facility])    Apr 22, 2019  3:00 PM CDT Follow up with Napoleon Caceres MD [address] [facility], [facility]            [address] [facility]

## 2019-05-01 PROBLEM — F41.1 GAD (GENERALIZED ANXIETY DISORDER): Status: ACTIVE | Noted: 2019-05-01

## 2019-05-01 NOTE — PROGRESS NOTES
[facility] [place]  PROGRESS NOTE  Chief Complaint:   Patient presents with:  Medication Follow-Up: Pt stopped welbutrin      HPI:   This is a 66year old female coming in for follow-up on her depression.   She said she took the Wellbutrin as pre Total 0.6 0.1 - 2.0 mg/dL    Total Protein 7.3 6.4 - 8.2 g/dL    Albumin 3.8 3.1 - 4.5 g/dL    Globulin  3.5 2.8 - 4.4 g/dL    A/G Ratio 1.1 1.0 - 2.0   LIPID PANEL   Result Value Ref Range    Cholesterol, Total 202 (H) <200 mg/dL    HDL Cholesterol 50 40 • TONSILLECTOMY       Social History:  Social History    Tobacco Use      Smoking status: Never Smoker      Smokeless tobacco: Never Used    Alcohol use: No      Alcohol/week: 0.0 oz    Drug use: No    Family History:  Family History   Problem Relation A severe problems with her irritable bowel syndrome. It is very much stress related. She alternates between severe diarrhea and constipation. MUSCULOSKELETAL:  Denies weakness, muscle aches, back pain, joint pain, swelling or stiffness.   NEUROLOGICAL:  [employer] cyanosis, no clubbing, FROM, 2+ dorsalis pedis pulses bilaterally. ASSESSMENT AND PLAN:   1. Moderate episode of recurrent major depressive disorder (HCC)  Her depression is active and currently not being treated.   Plan: Start fluoxetine 10 mg p.o. yuliya MD  5/1/2019  4:58 PM

## 2019-09-30 RX ORDER — HYOSCYAMINE SULFATE 0.125 MG
0.12 TABLET,DISINTEGRATING ORAL EVERY 4 HOURS PRN
Qty: 30 TABLET | Refills: 0 | Status: SHIPPED | OUTPATIENT
Start: 2019-09-30 | End: 2020-08-19

## 2019-09-30 NOTE — TELEPHONE ENCOUNTER
Future appt:     Your appointments     Date & Time Appointment Department [facility])    Oct 22, 2019  2:30 PM CDT Physical - Established with Federico Triplett MD [address] [place] [address] [address]

## 2019-10-25 ENCOUNTER — LAB ENCOUNTER (OUTPATIENT)
Dept: LAB | Age: 59
End: 2019-10-25
Attending: FAMILY MEDICINE
Payer: COMMERCIAL

## 2019-10-25 ENCOUNTER — OFFICE VISIT (OUTPATIENT)
Dept: FAMILY MEDICINE CLINIC | Facility: CLINIC | Age: 59
End: 2019-10-25
Payer: COMMERCIAL

## 2019-10-25 VITALS
OXYGEN SATURATION: 98 % | TEMPERATURE: 96 F | RESPIRATION RATE: 18 BRPM | DIASTOLIC BLOOD PRESSURE: 80 MMHG | WEIGHT: 120.81 LBS | BODY MASS INDEX: 20.63 KG/M2 | SYSTOLIC BLOOD PRESSURE: 120 MMHG | HEART RATE: 68 BPM | HEIGHT: 64 IN

## 2019-10-25 DIAGNOSIS — F33.1 MODERATE EPISODE OF RECURRENT MAJOR DEPRESSIVE DISORDER (HCC): ICD-10-CM

## 2019-10-25 DIAGNOSIS — K58.2 IRRITABLE BOWEL SYNDROME WITH BOTH CONSTIPATION AND DIARRHEA: ICD-10-CM

## 2019-10-25 DIAGNOSIS — K22.0 ACHALASIA OF ESOPHAGUS: ICD-10-CM

## 2019-10-25 DIAGNOSIS — M81.0 AGE-RELATED OSTEOPOROSIS WITHOUT CURRENT PATHOLOGICAL FRACTURE: ICD-10-CM

## 2019-10-25 DIAGNOSIS — Z00.00 HEALTHCARE MAINTENANCE: Primary | ICD-10-CM

## 2019-10-25 DIAGNOSIS — F41.1 GAD (GENERALIZED ANXIETY DISORDER): ICD-10-CM

## 2019-10-25 DIAGNOSIS — Z00.00 HEALTHCARE MAINTENANCE: ICD-10-CM

## 2019-10-25 DIAGNOSIS — Z28.21 IMMUNIZATION NOT CARRIED OUT BECAUSE OF PATIENT REFUSAL: ICD-10-CM

## 2019-10-25 PROCEDURE — 99213 OFFICE O/P EST LOW 20 MIN: CPT | Performed by: FAMILY MEDICINE

## 2019-10-25 PROCEDURE — 99396 PREV VISIT EST AGE 40-64: CPT | Performed by: FAMILY MEDICINE

## 2019-10-25 PROCEDURE — 36415 COLL VENOUS BLD VENIPUNCTURE: CPT | Performed by: FAMILY MEDICINE

## 2019-10-25 PROCEDURE — 80061 LIPID PANEL: CPT | Performed by: FAMILY MEDICINE

## 2019-10-25 PROCEDURE — 80050 GENERAL HEALTH PANEL: CPT | Performed by: FAMILY MEDICINE

## 2019-10-25 NOTE — PROGRESS NOTES
[place] [facility] [place]  PROGRESS NOTE  Chief Complaint:   Patient presents with:  Physical: Sees Dr Ayla Mancia for GYNE      HPI:   This is a 64year old female coming in for her annual wellness exam.  She said that overall she is doing pretty well. Ref Range    Cholesterol, Total 202 (H) <200 mg/dL    HDL Cholesterol 50 40 - 59 mg/dL    Triglycerides 60 30 - 149 mg/dL    LDL Cholesterol 140 (H) <100 mg/dL    VLDL 12 0 - 30 mg/dL    Non HDL Chol 152 (H) <130 mg/dL   ASSAY, THYROID STIM HORMONE   Resul standard drinks    Drug use: No    Family History:  Family History   Problem Relation Age of Onset   • Lipids Mother    • Other (Other[other]) Mother      Allergies:    Venlafaxine             NAUSEA AND VOMITING  Penicillin G            RASH  Penicillins dizziness, syncope, paralysis, ataxia, numbness or tingling in the extremities,change in bowel or bladder control. HEMATOLOGIC:  Denies anemia, bleeding or bruising. LYMPHATICS:  Denies enlarged nodes or history of splenectomy.   PSYCHIATRIC: Her symptoms reflexes. ASSESSMENT AND PLAN:   1. Healthcare maintenance  This is her annual wellness exam.  She is up-to-date on her Pap smear. She is due for a mammogram in November. She is due for blood work today. She declines an immunization for influenza.   - Education: Patient/Caregiver Education: There are no barriers to learning. Medical education done. Outcome: Patient verbalizes understanding. Patient is notified to call with any questions, complications, allergies, or worsening or changing symptoms.   Pa

## 2019-10-25 NOTE — PATIENT INSTRUCTIONS
Continue fluoxetine 10 mg daily. Continue to walk regularly. No new treatments recommended today. Check labs today.

## 2019-10-26 ENCOUNTER — TELEPHONE (OUTPATIENT)
Dept: FAMILY MEDICINE CLINIC | Facility: CLINIC | Age: 59
End: 2019-10-26

## 2019-10-26 NOTE — TELEPHONE ENCOUNTER
----- Message from Alexandrea Childers MD sent at 10/26/2019  7:58 AM CDT -----  Good news on the blood test.  Thyroid is normal.  Interestingly, cholesterol went up slightly to 226. The HDL cholesterol, the good one, went up to 59.   The LDL cholesterol, th

## 2020-01-22 NOTE — TELEPHONE ENCOUNTER
----- Message from Christie Branch MD sent at 10/19/2018  4:34 PM CDT -----  Bone scan definitely shows osteoporosis. It is slowly progressive. The T-scores are difficult to compare because the DEXA has been done on different machines.   However, it feng [facility]   Pre-operative History and Physical    Patient: Marium Nugent  : 1955  Acct#:         HISTORY OF PRESENT ILLNESS:    The patient is a 60 y.o. female  who presents with colon cancer screening  Past Medical History:        Diagnosis Date    Hyperlipidemia     Hypertension     slight     Past Surgical History:        Procedure Laterality Date    COLONOSCOPY       Medications prior to admission:   Prior to Admission medications    Medication Sig Start Date End Date Taking? Authorizing Provider   simvastatin (ZOCOR) 10 MG tablet Take 10 mg by mouth nightly   Yes Historical Provider, MD   carvedilol (COREG) 3.125 MG tablet Take 3.125 mg by mouth 2 times daily (with meals)   Yes Historical Provider, MD     Allergies:    Patient has no known allergies. Social History:   Social History     Socioeconomic History    Marital status: Single     Spouse name: Not on file    Number of children: Not on file    Years of education: Not on file    Highest education level: Not on file   Occupational History    Not on file   Social Needs    Financial resource strain: Not on file    Food insecurity:     Worry: Not on file     Inability: Not on file    Transportation needs:     Medical: Not on file     Non-medical: Not on file   Tobacco Use    Smoking status: Former Smoker    Smokeless tobacco: Never Used   Substance and Sexual Activity    Alcohol use:  Yes    Drug use: Not on file    Sexual activity: Not on file   Lifestyle    Physical activity:     Days per week: Not on file     Minutes per session: Not on file    Stress: Not on file   Relationships    Social connections:     Talks on phone: Not on file     Gets together: Not on file     Attends Episcopal service: Not on file     Active member of club or organization: Not on file     Attends meetings of clubs or organizations: Not on file     Relationship status: Not on file    Intimate partner violence:     Fear of current or ex partner: Not

## 2020-02-03 RX ORDER — FLUOXETINE 10 MG/1
10 CAPSULE ORAL DAILY
Qty: 90 CAPSULE | Refills: 1 | Status: SHIPPED | OUTPATIENT
Start: 2020-02-03 | End: 2020-06-03

## 2020-06-03 RX ORDER — FLUOXETINE 10 MG/1
10 CAPSULE ORAL DAILY
Qty: 90 CAPSULE | Refills: 1 | Status: SHIPPED | OUTPATIENT
Start: 2020-06-03 | End: 2020-11-05

## 2020-06-03 NOTE — TELEPHONE ENCOUNTER
Future appt:    Last Appointment with provider:   Visit date not found  Last appointment at [facility]:  Visit date not found  Cholesterol, Total (mg/dL)   Date Value   10/25/2019 226 (H)   09/25/2019 197     HDL Cholesterol (mg/dL)   Date Value   10/25/

## 2020-08-19 RX ORDER — HYOSCYAMINE SULFATE 0.125 MG
0.12 TABLET,DISINTEGRATING ORAL EVERY 4 HOURS PRN
Qty: 30 TABLET | Refills: 1 | Status: SHIPPED
Start: 2020-08-19

## 2020-08-19 NOTE — TELEPHONE ENCOUNTER
Future appt:    Last Appointment with provider:     10/2019    Last appointment at [facility]:  Visit date not found  Cholesterol, Total (mg/dL)   Date Value   10/25/2019 226 (H)   09/25/2019 197     HDL Cholesterol (mg/dL)   Date Value   10/25/2019 59

## 2020-11-05 NOTE — TELEPHONE ENCOUNTER
Overdue for physical and follow up.   Additional refill given, mychart message sent to schedule physical.

## 2020-11-05 NOTE — TELEPHONE ENCOUNTER
Future appt:    Last Appointment with provider:10-25-19Last appointment at [facility]: 10-25-19  Cholesterol, Total (mg/dL)   Date Value   10/25/2019 226 (H)   09/25/2019 197     HDL Cholesterol (mg/dL)   Date Value   10/25/2019 59   09/25/2019 58.9

## 2020-12-08 ENCOUNTER — OFFICE VISIT (OUTPATIENT)
Dept: FAMILY MEDICINE CLINIC | Facility: CLINIC | Age: 60
End: 2020-12-08
Payer: COMMERCIAL

## 2020-12-08 ENCOUNTER — LAB ENCOUNTER (OUTPATIENT)
Dept: LAB | Age: 60
End: 2020-12-08
Attending: FAMILY MEDICINE
Payer: COMMERCIAL

## 2020-12-08 VITALS
BODY MASS INDEX: 22.53 KG/M2 | HEART RATE: 102 BPM | HEIGHT: 64 IN | SYSTOLIC BLOOD PRESSURE: 114 MMHG | DIASTOLIC BLOOD PRESSURE: 62 MMHG | TEMPERATURE: 99 F | WEIGHT: 132 LBS | OXYGEN SATURATION: 98 % | RESPIRATION RATE: 16 BRPM

## 2020-12-08 DIAGNOSIS — M67.40 MUCOID CYST OF JOINT: ICD-10-CM

## 2020-12-08 DIAGNOSIS — K22.0 ACHALASIA OF ESOPHAGUS: ICD-10-CM

## 2020-12-08 DIAGNOSIS — K59.09 CHRONIC CONSTIPATION: ICD-10-CM

## 2020-12-08 DIAGNOSIS — F33.1 MODERATE EPISODE OF RECURRENT MAJOR DEPRESSIVE DISORDER (HCC): ICD-10-CM

## 2020-12-08 DIAGNOSIS — F41.1 GAD (GENERALIZED ANXIETY DISORDER): ICD-10-CM

## 2020-12-08 DIAGNOSIS — K58.2 IRRITABLE BOWEL SYNDROME WITH BOTH CONSTIPATION AND DIARRHEA: ICD-10-CM

## 2020-12-08 DIAGNOSIS — Z00.00 HEALTHCARE MAINTENANCE: Primary | ICD-10-CM

## 2020-12-08 DIAGNOSIS — E55.9 VITAMIN D DEFICIENCY: ICD-10-CM

## 2020-12-08 DIAGNOSIS — N90.4 LICHEN SCLEROSUS OF FEMALE GENITALIA: ICD-10-CM

## 2020-12-08 DIAGNOSIS — M81.0 AGE-RELATED OSTEOPOROSIS WITHOUT CURRENT PATHOLOGICAL FRACTURE: ICD-10-CM

## 2020-12-08 PROBLEM — Z01.818 PREOPERATIVE EXAMINATION: Status: RESOLVED | Noted: 2017-03-15 | Resolved: 2020-12-08

## 2020-12-08 PROCEDURE — 3074F SYST BP LT 130 MM HG: CPT | Performed by: FAMILY MEDICINE

## 2020-12-08 PROCEDURE — 3008F BODY MASS INDEX DOCD: CPT | Performed by: FAMILY MEDICINE

## 2020-12-08 PROCEDURE — 99213 OFFICE O/P EST LOW 20 MIN: CPT | Performed by: FAMILY MEDICINE

## 2020-12-08 PROCEDURE — 36415 COLL VENOUS BLD VENIPUNCTURE: CPT | Performed by: FAMILY MEDICINE

## 2020-12-08 PROCEDURE — 3078F DIAST BP <80 MM HG: CPT | Performed by: FAMILY MEDICINE

## 2020-12-08 PROCEDURE — 80061 LIPID PANEL: CPT | Performed by: FAMILY MEDICINE

## 2020-12-08 PROCEDURE — 99396 PREV VISIT EST AGE 40-64: CPT | Performed by: FAMILY MEDICINE

## 2020-12-08 PROCEDURE — 80050 GENERAL HEALTH PANEL: CPT | Performed by: FAMILY MEDICINE

## 2020-12-08 RX ORDER — DIAZEPAM 5 MG/1
5 TABLET ORAL EVERY 6 HOURS PRN
Qty: 10 TABLET | Refills: 0 | Status: SHIPPED | OUTPATIENT
Start: 2020-12-08 | End: 2021-12-15

## 2020-12-08 NOTE — PROGRESS NOTES
[facility] [place]  PROGRESS NOTE  Chief Complaint:   Patient presents with:  Physical: yearly      HPI:   This is a 64year old female coming in for her annual wellness visit. She reports that she is doing relatively well.   Her  is in atresia    • Frequent UTI    • Menopausal and female climacteric states      Past Surgical History:   Procedure Laterality Date   • COLONOSCOPY     • D & C     • EGD  2015   • LEEP     •      • OTHER SURGICAL HISTORY      Right Ovary Removal   • lesion, or excessive skin dryness. CARDIOVASCULAR:  Denies chest pain, chest pressure, chest discomfort, palpitations, edema, dyspnea on exertion or at rest.  RESPIRATORY:  Denies shortness of breath, wheezing, cough or sputum.   GASTROINTESTINAL: Her lynn auscultation bilterally, no rales/rhonchi/wheezing. ABDOMEN:  Soft, nondistended, nontender, bowel sounds normal in all 4 quadrants, no masses, no hepatosplenomegaly.   EXTREMITIES:  No edema, no cyanosis, no clubbing, FROM, 2+ dorsalis pedis pulses bilate of vitamin D deficiency. It is stable. 7. Irritable bowel syndrome with both constipation and diarrhea  She has had intermittent irritable bowel syndrome with both constipation and diarrhea.   Her symptoms are now under good control.  - CBC WITH OLIVIA constipation     Depression     Hematochezia     Osteoporosis     Vitamin D deficiency     Mucoid cyst of joint     Chronic gum disease     Lichen sclerosus of female genitalia     Irritable bowel syndrome (IBS)     Herpes simplex     Healthcare maintenanc

## 2020-12-09 ENCOUNTER — TELEPHONE (OUTPATIENT)
Dept: FAMILY MEDICINE CLINIC | Facility: CLINIC | Age: 60
End: 2020-12-09

## 2020-12-09 NOTE — TELEPHONE ENCOUNTER
----- Message from Janny Melara MD sent at 12/9/2020  7:58 AM CST -----  Blood sugar is normal so no signs of diabetes. Kidney function is normal.  Cholesterol is 237 which is slightly higher than last year. Last year was 226.   Thyroid is normal.  H

## 2021-02-17 ENCOUNTER — PATIENT MESSAGE (OUTPATIENT)
Dept: FAMILY MEDICINE CLINIC | Facility: CLINIC | Age: 61
End: 2021-02-17

## 2021-02-17 NOTE — TELEPHONE ENCOUNTER
From: Ashwini Lawrence  To: Kenyetta Hood MD  Sent: 2/17/2021 9:06 AM CST  Subject: Visit Follow-up Question    Good Morning Dr. Pagan November:    I have some questions regarding the Shingles Vaccine. ....     1. Sridevi Eckert received his first Shingles Vaccine at his

## 2021-02-17 NOTE — TELEPHONE ENCOUNTER
Chris Pappas may receive the second shingles vaccine at any facility that gives shingles vaccines. That usually is local pharmacies. He can check with any of the local pharmacies where he is staying now to see if any of them would be able to give it.   He can als

## 2021-07-20 ENCOUNTER — PATIENT MESSAGE (OUTPATIENT)
Dept: FAMILY MEDICINE CLINIC | Facility: CLINIC | Age: 61
End: 2021-07-20

## 2021-07-20 RX ORDER — FLUOXETINE 10 MG/1
CAPSULE ORAL
Qty: 90 CAPSULE | Refills: 1 | Status: SHIPPED | OUTPATIENT
Start: 2021-07-20 | End: 2021-12-15

## 2021-07-20 RX ORDER — FLUOXETINE 10 MG/1
10 CAPSULE ORAL DAILY
COMMUNITY
End: 2021-12-15

## 2021-07-20 NOTE — TELEPHONE ENCOUNTER
Phoned patient to confirm Fluoxitine Dose. Patient states 10mg. See refill request from [facility] for refill.   Oksana Manzano, 07/20/21, 11:59 AM

## 2021-07-20 NOTE — TELEPHONE ENCOUNTER
Fuoxetine: 11/5/20       Return in about 1 year (around 12/8/2021).     Future appt:    Last Appointment with provider:  12/8/20    Last appointment at [facility]:  Visit date not found  Cholesterol, Total (mg/dL)   Date Value   12/08/2020 237 (H)   09/25

## 2021-07-20 NOTE — TELEPHONE ENCOUNTER
Patient confirmed Fluoxitine 10mg dose is being requested. Fluoxetine:  6/3/20     Return in about 1 year (around 12/8/2021).   Future appt:    Last Appointment with provider:  12/8/20    Last appointment at [facility]:  Visit date not found  Cholester

## 2021-07-20 NOTE — TELEPHONE ENCOUNTER
From: Radha Lugo  To: Jaclyn Gaona MD  Sent: 7/20/2021 10:52 AM CDT  Subject: Prescription Question    I am requiring a refill order for my Fluoxetine.  I tried to refill through my mail order provider [facility] and the site showed no refills geovanny

## 2021-07-24 ENCOUNTER — TELEPHONE (OUTPATIENT)
Dept: FAMILY MEDICINE CLINIC | Facility: CLINIC | Age: 61
End: 2021-07-24

## 2021-07-24 NOTE — TELEPHONE ENCOUNTER
Pt states that her daughter is getting covid test today, wants to know if Dr can send order so she can get one too. States that she has a low grade fever and feels tired. Would like a call back.

## 2021-07-24 NOTE — TELEPHONE ENCOUNTER
Pt was calling for an covid test order- I informed her that I could set up a phone visit but she didn't want to do that. She is going to find a free testing site. Nothing further at this time.

## 2021-12-15 ENCOUNTER — LAB ENCOUNTER (OUTPATIENT)
Dept: LAB | Age: 61
End: 2021-12-15
Attending: FAMILY MEDICINE
Payer: COMMERCIAL

## 2021-12-15 ENCOUNTER — OFFICE VISIT (OUTPATIENT)
Dept: FAMILY MEDICINE CLINIC | Facility: CLINIC | Age: 61
End: 2021-12-15
Payer: COMMERCIAL

## 2021-12-15 VITALS
SYSTOLIC BLOOD PRESSURE: 110 MMHG | TEMPERATURE: 96 F | HEIGHT: 64 IN | RESPIRATION RATE: 14 BRPM | BODY MASS INDEX: 24.07 KG/M2 | WEIGHT: 141 LBS | HEART RATE: 62 BPM | OXYGEN SATURATION: 96 % | DIASTOLIC BLOOD PRESSURE: 70 MMHG

## 2021-12-15 DIAGNOSIS — N90.4 LICHEN SCLEROSUS OF FEMALE GENITALIA: ICD-10-CM

## 2021-12-15 DIAGNOSIS — K22.0 ACHALASIA OF ESOPHAGUS: ICD-10-CM

## 2021-12-15 DIAGNOSIS — Z00.00 HEALTHCARE MAINTENANCE: Primary | ICD-10-CM

## 2021-12-15 DIAGNOSIS — K59.09 CHRONIC CONSTIPATION: ICD-10-CM

## 2021-12-15 DIAGNOSIS — K06.9 CHRONIC GUM DISEASE: ICD-10-CM

## 2021-12-15 DIAGNOSIS — F33.1 MODERATE EPISODE OF RECURRENT MAJOR DEPRESSIVE DISORDER (HCC): ICD-10-CM

## 2021-12-15 DIAGNOSIS — M81.0 AGE-RELATED OSTEOPOROSIS WITHOUT CURRENT PATHOLOGICAL FRACTURE: ICD-10-CM

## 2021-12-15 DIAGNOSIS — E55.9 VITAMIN D DEFICIENCY: ICD-10-CM

## 2021-12-15 DIAGNOSIS — K58.2 IRRITABLE BOWEL SYNDROME WITH BOTH CONSTIPATION AND DIARRHEA: ICD-10-CM

## 2021-12-15 DIAGNOSIS — F41.1 GAD (GENERALIZED ANXIETY DISORDER): ICD-10-CM

## 2021-12-15 PROBLEM — M67.40 MUCOID CYST OF JOINT: Status: RESOLVED | Noted: 2017-03-15 | Resolved: 2021-12-15

## 2021-12-15 PROCEDURE — 3008F BODY MASS INDEX DOCD: CPT | Performed by: FAMILY MEDICINE

## 2021-12-15 PROCEDURE — 3074F SYST BP LT 130 MM HG: CPT | Performed by: FAMILY MEDICINE

## 2021-12-15 PROCEDURE — 3078F DIAST BP <80 MM HG: CPT | Performed by: FAMILY MEDICINE

## 2021-12-15 PROCEDURE — 82306 VITAMIN D 25 HYDROXY: CPT | Performed by: FAMILY MEDICINE

## 2021-12-15 PROCEDURE — 90750 HZV VACC RECOMBINANT IM: CPT | Performed by: FAMILY MEDICINE

## 2021-12-15 PROCEDURE — 99213 OFFICE O/P EST LOW 20 MIN: CPT | Performed by: FAMILY MEDICINE

## 2021-12-15 PROCEDURE — 80050 GENERAL HEALTH PANEL: CPT | Performed by: FAMILY MEDICINE

## 2021-12-15 PROCEDURE — 80061 LIPID PANEL: CPT | Performed by: FAMILY MEDICINE

## 2021-12-15 PROCEDURE — 90471 IMMUNIZATION ADMIN: CPT | Performed by: FAMILY MEDICINE

## 2021-12-15 PROCEDURE — 84550 ASSAY OF BLOOD/URIC ACID: CPT | Performed by: FAMILY MEDICINE

## 2021-12-15 PROCEDURE — 86769 SARS-COV-2 COVID-19 ANTIBODY: CPT | Performed by: FAMILY MEDICINE

## 2021-12-15 PROCEDURE — 99396 PREV VISIT EST AGE 40-64: CPT | Performed by: FAMILY MEDICINE

## 2021-12-15 RX ORDER — DIAZEPAM 5 MG/1
5 TABLET ORAL EVERY 6 HOURS PRN
Qty: 10 TABLET | Refills: 0 | Status: SHIPPED | OUTPATIENT
Start: 2021-12-15

## 2021-12-15 RX ORDER — FLUOXETINE 10 MG/1
10 CAPSULE ORAL DAILY
Qty: 90 CAPSULE | Refills: 1 | Status: SHIPPED | OUTPATIENT
Start: 2021-12-15

## 2021-12-15 NOTE — PROGRESS NOTES
[facility]  PROGRESS NOTE  Chief Complaint:   Patient presents with:  Physical      HPI:   This is a 63year old female coming in for her annual wellness visit. She reports that she is moved to [facility].   She is doing regular exercise cl (H) <200 mg/dL    HDL Cholesterol 59 40 - 59 mg/dL    Triglycerides 68 30 - 149 mg/dL    LDL Cholesterol 164 (H) <100 mg/dL    VLDL 14 0 - 30 mg/dL    Non HDL Chol 178 (H) <130 mg/dL    FASTING Yes    ASSAY, THYROID STIM HORMONE   Result Value Ref Range Allergies:    Venlafaxine             NAUSEA AND VOMITING  Penicillin G            RASH  Penicillins               Wellbutrin [Bupropi*    TINITUS  Current Meds:  Current Outpatient Medications   Medication Sig Dispense Refill   • FLUoxetine 10 MG Oral or bruising. LYMPHATICS:  Denies enlarged nodes or history of splenectomy. PSYCHIATRIC: Her depression and anxiety are well controlled with the current medications. She does not want to make any changes now.   ENDOCRINOLOGIC:  Denies excessive sweating, mammogram.  She will call to schedule that. She declines the COVID-19 vaccine. She declines influenza vaccine. She would like the shingles vaccine today. She would like to have the COVID-19 antibodies done to know if she has had the Covid infection.   - Future  - COMP METABOLIC PANEL (14); Future  - LIPID PANEL; Future  - ASSAY, THYROID STIM HORMONE; Future  - URIC ACID; Future  - VITAMIN D; Future  - SARS-COV-2 TOTAL ANTIBODY; Future    7. Achalasia of esophagus  Unchanged.     8. Age-related osteoporosis

## 2021-12-17 ENCOUNTER — TELEPHONE (OUTPATIENT)
Dept: FAMILY MEDICINE CLINIC | Facility: CLINIC | Age: 61
End: 2021-12-17

## 2021-12-17 NOTE — TELEPHONE ENCOUNTER
----- Message from Wyatt Moyer MD sent at 12/17/2021  9:04 AM CST -----  Vitamin D level is excellent at 68. 3. The current dose of vitamin D appears to be adequate.   CBC is normal.  Chemistry profile is normal.  Blood sugar is normal at 80 so no sig

## 2021-12-20 NOTE — TELEPHONE ENCOUNTER
----- Message from Wyatt Moyer MD sent at 12/20/2021  9:12 AM CST -----  Cholesterol is unchanged at 238. Last year it was 237. Triglycerides are normal.  HDL cholesterol is high at 53. LDL cholesterol remains elevated at 168. Impression: Abbie

## 2022-02-17 ENCOUNTER — TELEPHONE (OUTPATIENT)
Dept: FAMILY MEDICINE CLINIC | Facility: CLINIC | Age: 62
End: 2022-02-17

## 2022-02-17 NOTE — TELEPHONE ENCOUNTER
Patient is due for Shingrex #2 anytime between now and 6/15. Patient has a [facility] 15 appointment on 2/28 and asked to schedule vaccine immediately afterward. Done as requested.     Future Appointments   Date Time Provider Tala Chau   2/28/2022  1:30 PM Kendra Cuello LCPC [facility] [place]   2/28/2022  2:30 PM [facility] [place] NURSE [facility] [place]

## 2022-02-28 ENCOUNTER — NURSE ONLY (OUTPATIENT)
Dept: FAMILY MEDICINE CLINIC | Facility: CLINIC | Age: 62
End: 2022-02-28
Payer: COMMERCIAL

## 2022-02-28 DIAGNOSIS — Z23 NEED FOR SHINGLES VACCINE: ICD-10-CM

## 2022-02-28 PROCEDURE — 90750 HZV VACC RECOMBINANT IM: CPT | Performed by: FAMILY MEDICINE

## 2022-02-28 PROCEDURE — 90471 IMMUNIZATION ADMIN: CPT | Performed by: FAMILY MEDICINE

## 2022-05-05 NOTE — TELEPHONE ENCOUNTER
Future appt: Your appointments     Date & Time Appointment Department [facility])    May 16, 2022  2:30 PM CDT Follow up with Go De La O, [address] ([facility])            [address] [facility]  [phone]        Last Appointment with provider:   12/15/2021 for annual physical. Return in 1 year. Last appointment at [facility]:  12/15/2021  Cholesterol, Total (mg/dL)   Date Value   12/15/2021 238 (H)   12/08/2020 237 (H)   09/25/2019 197     HDL Cholesterol (mg/dL)   Date Value   12/15/2021 53   12/08/2020 59   09/25/2019 58.9     LDL Cholesterol (mg/dL)   Date Value   12/15/2021 168 (H)   12/08/2020 164 (H)   09/25/2019 126     Triglycerides (mg/dL)   Date Value   12/15/2021 85   12/08/2020 68   09/25/2019 60     Lab Results   Component Value Date    A1C 5.7 09/25/2019     Lab Results   Component Value Date    TSH 0.775 12/15/2021       No follow-ups on file.

## 2022-05-06 RX ORDER — FLUOXETINE 10 MG/1
CAPSULE ORAL
Qty: 90 CAPSULE | Refills: 1 | Status: SHIPPED | OUTPATIENT
Start: 2022-05-06 | End: 2022-12-22

## 2022-05-16 RX ORDER — ACYCLOVIR 50 MG/G
OINTMENT TOPICAL
Qty: 15 G | Refills: 5 | Status: SHIPPED | OUTPATIENT
Start: 2022-05-16

## 2022-05-16 NOTE — TELEPHONE ENCOUNTER
Pt needs a refill on her Acyclovir 5% external ointment to [facility] in [place]. Pt is at her appt with Chloe Rogers right now, states that she can either stop on her way out of her appt to grab script or it can be faxed directly to the pharmacy.

## 2022-05-16 NOTE — TELEPHONE ENCOUNTER
Acyclovir: 9/12/17    Future appt:    Last Appointment with provider:   12/15/2021  Last appointment at [facility] [place]:  12/15/2021  Cholesterol, Total (mg/dL)   Date Value   12/15/2021 238 (H)   12/08/2020 237 (H)   09/25/2019 197     HDL Cholesterol (mg/dL)   Date Value   12/15/2021 53   12/08/2020 59   09/25/2019 58.9     LDL Cholesterol (mg/dL)   Date Value   12/15/2021 168 (H)   12/08/2020 164 (H)   09/25/2019 126     Triglycerides (mg/dL)   Date Value   12/15/2021 85   12/08/2020 68   09/25/2019 60     Lab Results   Component Value Date    A1C 5.7 09/25/2019     Lab Results   Component Value Date    TSH 0.775 12/15/2021       No follow-ups on file.

## 2022-12-07 ENCOUNTER — PATIENT MESSAGE (OUTPATIENT)
Dept: FAMILY MEDICINE CLINIC | Facility: CLINIC | Age: 62
End: 2022-12-07

## 2022-12-09 ENCOUNTER — OFFICE VISIT (OUTPATIENT)
Dept: FAMILY MEDICINE CLINIC | Facility: CLINIC | Age: 62
End: 2022-12-09
Payer: COMMERCIAL

## 2022-12-09 ENCOUNTER — LAB ENCOUNTER (OUTPATIENT)
Dept: LAB | Age: 62
End: 2022-12-09
Attending: NURSE PRACTITIONER
Payer: COMMERCIAL

## 2022-12-09 VITALS
DIASTOLIC BLOOD PRESSURE: 80 MMHG | TEMPERATURE: 97 F | WEIGHT: 132.63 LBS | HEIGHT: 64 IN | OXYGEN SATURATION: 98 % | SYSTOLIC BLOOD PRESSURE: 130 MMHG | BODY MASS INDEX: 22.64 KG/M2 | HEART RATE: 79 BPM | RESPIRATION RATE: 18 BRPM

## 2022-12-09 DIAGNOSIS — L65.8 TRAUMATIC HAIR LOSS: Primary | ICD-10-CM

## 2022-12-09 DIAGNOSIS — L65.8 TRAUMATIC HAIR LOSS: ICD-10-CM

## 2022-12-09 DIAGNOSIS — F41.1 GAD (GENERALIZED ANXIETY DISORDER): ICD-10-CM

## 2022-12-09 DIAGNOSIS — E55.9 VITAMIN D DEFICIENCY: ICD-10-CM

## 2022-12-09 DIAGNOSIS — R06.83 SNORING: ICD-10-CM

## 2022-12-09 LAB
ALBUMIN SERPL-MCNC: 4.1 G/DL (ref 3.4–5)
ALBUMIN/GLOB SERPL: 1.1 {RATIO} (ref 1–2)
ALP LIVER SERPL-CCNC: 74 U/L
ALT SERPL-CCNC: 41 U/L
ANION GAP SERPL CALC-SCNC: 5 MMOL/L (ref 0–18)
AST SERPL-CCNC: 25 U/L (ref 15–37)
BASOPHILS # BLD AUTO: 0.1 X10(3) UL (ref 0–0.2)
BASOPHILS NFR BLD AUTO: 1.9 %
BILIRUB SERPL-MCNC: 0.6 MG/DL (ref 0.1–2)
BUN BLD-MCNC: 17 MG/DL (ref 7–18)
CALCIUM BLD-MCNC: 9.3 MG/DL (ref 8.5–10.1)
CHLORIDE SERPL-SCNC: 108 MMOL/L (ref 98–112)
CO2 SERPL-SCNC: 28 MMOL/L (ref 21–32)
CREAT BLD-MCNC: 0.94 MG/DL
DEPRECATED HBV CORE AB SER IA-ACNC: 96.7 NG/ML
EOSINOPHIL # BLD AUTO: 0 X10(3) UL (ref 0–0.7)
EOSINOPHIL NFR BLD AUTO: 0 %
ERYTHROCYTE [DISTWIDTH] IN BLOOD BY AUTOMATED COUNT: 14.4 %
FASTING STATUS PATIENT QL REPORTED: YES
GFR SERPLBLD BASED ON 1.73 SQ M-ARVRAT: 69 ML/MIN/1.73M2 (ref 60–?)
GLOBULIN PLAS-MCNC: 3.6 G/DL (ref 2.8–4.4)
GLUCOSE BLD-MCNC: 103 MG/DL (ref 70–99)
HCT VFR BLD AUTO: 43.9 %
HGB BLD-MCNC: 14.5 G/DL
IMM GRANULOCYTES # BLD AUTO: 0.01 X10(3) UL (ref 0–1)
IMM GRANULOCYTES NFR BLD: 0.2 %
IRON SATN MFR SERPL: 21 %
IRON SERPL-MCNC: 72 UG/DL
LYMPHOCYTES # BLD AUTO: 1.92 X10(3) UL (ref 1–4)
LYMPHOCYTES NFR BLD AUTO: 37.1 %
MCH RBC QN AUTO: 30.1 PG (ref 26–34)
MCHC RBC AUTO-ENTMCNC: 33 G/DL (ref 31–37)
MCV RBC AUTO: 91.1 FL
MONOCYTES # BLD AUTO: 0.7 X10(3) UL (ref 0.1–1)
MONOCYTES NFR BLD AUTO: 13.5 %
NEUTROPHILS # BLD AUTO: 2.44 X10 (3) UL (ref 1.5–7.7)
NEUTROPHILS # BLD AUTO: 2.44 X10(3) UL (ref 1.5–7.7)
NEUTROPHILS NFR BLD AUTO: 47.3 %
OSMOLALITY SERPL CALC.SUM OF ELEC: 294 MOSM/KG (ref 275–295)
PLATELET # BLD AUTO: 329 10(3)UL (ref 150–450)
POTASSIUM SERPL-SCNC: 4.3 MMOL/L (ref 3.5–5.1)
PROT SERPL-MCNC: 7.7 G/DL (ref 6.4–8.2)
RBC # BLD AUTO: 4.82 X10(6)UL
SODIUM SERPL-SCNC: 141 MMOL/L (ref 136–145)
T4 FREE SERPL-MCNC: 1.2 NG/DL (ref 0.8–1.7)
THYROGLOB SERPL-MCNC: <15 U/ML (ref ?–60)
THYROPEROXIDASE AB SERPL-ACNC: 48 U/ML (ref ?–60)
TIBC SERPL-MCNC: 340 UG/DL (ref 240–450)
TRANSFERRIN SERPL-MCNC: 228 MG/DL (ref 200–360)
TSI SER-ACNC: 0.65 MIU/ML (ref 0.36–3.74)
VIT B12 SERPL-MCNC: 851 PG/ML (ref 193–986)
VIT D+METAB SERPL-MCNC: 90.7 NG/ML (ref 30–100)
WBC # BLD AUTO: 5.2 X10(3) UL (ref 4–11)

## 2022-12-09 PROCEDURE — 3008F BODY MASS INDEX DOCD: CPT | Performed by: NURSE PRACTITIONER

## 2022-12-09 PROCEDURE — 86800 THYROGLOBULIN ANTIBODY: CPT | Performed by: NURSE PRACTITIONER

## 2022-12-09 PROCEDURE — 99215 OFFICE O/P EST HI 40 MIN: CPT | Performed by: NURSE PRACTITIONER

## 2022-12-09 PROCEDURE — 83540 ASSAY OF IRON: CPT | Performed by: NURSE PRACTITIONER

## 2022-12-09 PROCEDURE — 86376 MICROSOMAL ANTIBODY EACH: CPT | Performed by: NURSE PRACTITIONER

## 2022-12-09 PROCEDURE — 83550 IRON BINDING TEST: CPT | Performed by: NURSE PRACTITIONER

## 2022-12-09 PROCEDURE — 84439 ASSAY OF FREE THYROXINE: CPT | Performed by: NURSE PRACTITIONER

## 2022-12-09 PROCEDURE — 80050 GENERAL HEALTH PANEL: CPT | Performed by: NURSE PRACTITIONER

## 2022-12-09 PROCEDURE — 82607 VITAMIN B-12: CPT | Performed by: NURSE PRACTITIONER

## 2022-12-09 PROCEDURE — 3075F SYST BP GE 130 - 139MM HG: CPT | Performed by: NURSE PRACTITIONER

## 2022-12-09 PROCEDURE — 82306 VITAMIN D 25 HYDROXY: CPT | Performed by: NURSE PRACTITIONER

## 2022-12-09 PROCEDURE — 82728 ASSAY OF FERRITIN: CPT | Performed by: NURSE PRACTITIONER

## 2022-12-09 PROCEDURE — 3079F DIAST BP 80-89 MM HG: CPT | Performed by: NURSE PRACTITIONER

## 2022-12-13 ENCOUNTER — PATIENT MESSAGE (OUTPATIENT)
Dept: FAMILY MEDICINE CLINIC | Facility: CLINIC | Age: 62
End: 2022-12-13

## 2022-12-13 DIAGNOSIS — L65.8 TRAUMATIC HAIR LOSS: Primary | ICD-10-CM

## 2022-12-22 RX ORDER — FLUOXETINE 10 MG/1
10 CAPSULE ORAL DAILY
Qty: 90 CAPSULE | Refills: 1 | Status: SHIPPED | OUTPATIENT
Start: 2022-12-22

## 2022-12-22 NOTE — TELEPHONE ENCOUNTER
Last appt 12/15/21 advised Return in about 1 year (around 12/15/2022). Future appt: Your appointments     Date & Time Appointment Department [facility])    Dec 27, 2022  8:30 AM CST Follow Up Visit with Tommy Barnett Sheri Yamila Ledezma ([facility])        Jan 04, 2023  9:00 AM CST Sleep Consult with Tommy Barnett [facility])        Jan 09, 2023 12:30 PM CST [facility]        Jan 23, 2023 12:30 PM CST Follow up with [address] ([facility])        Feb 28, 2023  9:45 AM CST Physical - Established with Adelaide Langley MD [address] [facility] Yamila Davis ([facility])            [address] [place] [address]  [phone]  [address] [facility] [place]  [phone]  [phone]        Last Appointment with provider:   Visit date not found  [facility] appointment [facility]:  12/9/2022  Cholesterol, Total (mg/dL)   Date Value   12/15/2021 238 (H)   12/08/2020 237 (H)   09/25/2019 197     HDL Cholesterol (mg/dL)   Date Value   12/15/2021 53   12/08/2020 59   09/25/2019 58.9     LDL Cholesterol (mg/dL)   Date Value   12/15/2021 168 (H)   12/08/2020 164 (H)   09/25/2019 126     Triglycerides (mg/dL)   Date Value   12/15/2021 85   12/08/2020 68   09/25/2019 60     Lab Results   Component Value Date    A1C 5.7 09/25/2019     Lab Results   Component Value Date    T4F 1.2 12/09/2022    TSH 0.647 12/09/2022       No follow-ups on file.

## 2022-12-27 ENCOUNTER — TELEPHONE (OUTPATIENT)
Dept: FAMILY MEDICINE CLINIC | Facility: CLINIC | Age: 62
End: 2022-12-27

## 2022-12-27 ENCOUNTER — PATIENT MESSAGE (OUTPATIENT)
Dept: FAMILY MEDICINE CLINIC | Facility: CLINIC | Age: 62
End: 2022-12-27

## 2022-12-27 NOTE — TELEPHONE ENCOUNTER
From: Siva Nam  To: VIVIEN Jasmine  Sent: 12/27/2022 4:38 PM CST  Subject: Appt NO SHOW    I cancelled my Dec 27 appt last Friday December 23. I received a text stating that my appt was  cancelled. Therefore I did not come this morning. Then in my email I saw a reminder to e checkin for the Dec 27th and I called the office to clarify.  Please see attached

## 2022-12-27 NOTE — TELEPHONE ENCOUNTER
Patient called and said she cancelled her appt today with Xiang Desai thru the automated message. Appt was not cancelled. Cancelled appt as a last cancellation per Jimmy FITCH

## 2023-01-26 ENCOUNTER — SLEEP STUDY (OUTPATIENT)
Dept: FAMILY MEDICINE CLINIC | Facility: CLINIC | Age: 63
End: 2023-01-26
Payer: COMMERCIAL

## 2023-01-26 DIAGNOSIS — G47.33 OSA (OBSTRUCTIVE SLEEP APNEA): Primary | ICD-10-CM

## 2023-01-26 PROCEDURE — 95806 SLEEP STUDY UNATT&RESP EFFT: CPT | Performed by: FAMILY MEDICINE

## 2023-01-30 ENCOUNTER — TELEPHONE (OUTPATIENT)
Dept: FAMILY MEDICINE CLINIC | Facility: CLINIC | Age: 63
End: 2023-01-30

## 2023-01-30 NOTE — TELEPHONE ENCOUNTER
Updated flowsheet. Sent patient a [facility] message to schedule a sleep follow up visit to discuss sleep study.

## 2023-02-27 ENCOUNTER — OFFICE VISIT (OUTPATIENT)
Dept: FAMILY MEDICINE CLINIC | Facility: CLINIC | Age: 63
End: 2023-02-27
Payer: COMMERCIAL

## 2023-02-27 VITALS
DIASTOLIC BLOOD PRESSURE: 74 MMHG | HEART RATE: 68 BPM | OXYGEN SATURATION: 97 % | BODY MASS INDEX: 24.38 KG/M2 | TEMPERATURE: 98 F | WEIGHT: 142.81 LBS | RESPIRATION RATE: 16 BRPM | SYSTOLIC BLOOD PRESSURE: 126 MMHG | HEIGHT: 64 IN

## 2023-02-27 DIAGNOSIS — G47.33 OSA (OBSTRUCTIVE SLEEP APNEA): Primary | ICD-10-CM

## 2023-02-27 DIAGNOSIS — R06.83 SNORING: ICD-10-CM

## 2023-02-27 DIAGNOSIS — F41.1 GAD (GENERALIZED ANXIETY DISORDER): ICD-10-CM

## 2023-02-27 DIAGNOSIS — G47.9 SLEEP DISTURBANCE: ICD-10-CM

## 2023-02-27 PROCEDURE — 3008F BODY MASS INDEX DOCD: CPT | Performed by: NURSE PRACTITIONER

## 2023-02-27 PROCEDURE — 3074F SYST BP LT 130 MM HG: CPT | Performed by: NURSE PRACTITIONER

## 2023-02-27 PROCEDURE — 3078F DIAST BP <80 MM HG: CPT | Performed by: NURSE PRACTITIONER

## 2023-02-27 PROCEDURE — 99214 OFFICE O/P EST MOD 30 MIN: CPT | Performed by: NURSE PRACTITIONER

## 2023-02-27 NOTE — PATIENT INSTRUCTIONS
Start auto PAP therapy. Order sent to [facility]. They will call you to get set up. Follow up in 2 weeks after starting to use machine at home. Continue sleep therapy. Follow-up sooner if needed. Advised if still with sleep apnea and not using CPAP has a  7 fold increase in risk of heart attack, stroke, abnormal heart rhythm  and death,  increased risk of driving accidents. Advised to refrain from driving when sleepy. COMPLIANCE is required by insurance for 4 hours a night most nights of the week. Recommend weight loss, and maintain and optimal BMI with Exercise 30 minutes most days of the week to target heart rate . Advised patient to change filters,masks,hoses  and tubes and equiptment on a  regular schedule. Filters and seals shall be changed every 1 month,  Hoses every 3 months,   CPAP mask and humidifier  chamber changed every 6 month  with the Durable medical equipment provider.

## 2023-02-28 ENCOUNTER — LAB ENCOUNTER (OUTPATIENT)
Dept: LAB | Age: 63
End: 2023-02-28
Attending: FAMILY MEDICINE
Payer: COMMERCIAL

## 2023-02-28 ENCOUNTER — OFFICE VISIT (OUTPATIENT)
Dept: FAMILY MEDICINE CLINIC | Facility: CLINIC | Age: 63
End: 2023-02-28
Payer: COMMERCIAL

## 2023-02-28 VITALS
OXYGEN SATURATION: 96 % | HEIGHT: 64 IN | SYSTOLIC BLOOD PRESSURE: 122 MMHG | BODY MASS INDEX: 24.45 KG/M2 | RESPIRATION RATE: 16 BRPM | WEIGHT: 143.19 LBS | TEMPERATURE: 97 F | DIASTOLIC BLOOD PRESSURE: 70 MMHG | HEART RATE: 88 BPM

## 2023-02-28 DIAGNOSIS — K06.9 CHRONIC GUM DISEASE: ICD-10-CM

## 2023-02-28 DIAGNOSIS — F41.1 GAD (GENERALIZED ANXIETY DISORDER): ICD-10-CM

## 2023-02-28 DIAGNOSIS — E55.9 VITAMIN D DEFICIENCY: ICD-10-CM

## 2023-02-28 DIAGNOSIS — L65.8 FEMALE PATTERN BALDNESS: ICD-10-CM

## 2023-02-28 DIAGNOSIS — Z00.00 HEALTHCARE MAINTENANCE: ICD-10-CM

## 2023-02-28 DIAGNOSIS — N90.4 LICHEN SCLEROSUS OF FEMALE GENITALIA: ICD-10-CM

## 2023-02-28 DIAGNOSIS — K22.0 ACHALASIA OF ESOPHAGUS: ICD-10-CM

## 2023-02-28 DIAGNOSIS — G47.33 OSA (OBSTRUCTIVE SLEEP APNEA): ICD-10-CM

## 2023-02-28 DIAGNOSIS — K58.2 IRRITABLE BOWEL SYNDROME WITH BOTH CONSTIPATION AND DIARRHEA: ICD-10-CM

## 2023-02-28 DIAGNOSIS — F33.1 MODERATE EPISODE OF RECURRENT MAJOR DEPRESSIVE DISORDER (HCC): ICD-10-CM

## 2023-02-28 DIAGNOSIS — K59.09 CHRONIC CONSTIPATION: ICD-10-CM

## 2023-02-28 DIAGNOSIS — Z00.00 HEALTHCARE MAINTENANCE: Primary | ICD-10-CM

## 2023-02-28 DIAGNOSIS — M81.0 AGE-RELATED OSTEOPOROSIS WITHOUT CURRENT PATHOLOGICAL FRACTURE: ICD-10-CM

## 2023-02-28 LAB
CHOLEST SERPL-MCNC: 240 MG/DL (ref ?–200)
FASTING PATIENT LIPID ANSWER: YES
HDLC SERPL-MCNC: 55 MG/DL (ref 40–59)
LDLC SERPL CALC-MCNC: 171 MG/DL (ref ?–100)
NONHDLC SERPL-MCNC: 185 MG/DL (ref ?–130)
TRIGL SERPL-MCNC: 80 MG/DL (ref 30–149)
VLDLC SERPL CALC-MCNC: 16 MG/DL (ref 0–30)

## 2023-02-28 PROCEDURE — 80061 LIPID PANEL: CPT | Performed by: FAMILY MEDICINE

## 2023-02-28 PROCEDURE — 3078F DIAST BP <80 MM HG: CPT | Performed by: FAMILY MEDICINE

## 2023-02-28 PROCEDURE — 3008F BODY MASS INDEX DOCD: CPT | Performed by: FAMILY MEDICINE

## 2023-02-28 PROCEDURE — 99396 PREV VISIT EST AGE 40-64: CPT | Performed by: FAMILY MEDICINE

## 2023-02-28 PROCEDURE — 3074F SYST BP LT 130 MM HG: CPT | Performed by: FAMILY MEDICINE

## 2023-02-28 PROCEDURE — 99213 OFFICE O/P EST LOW 20 MIN: CPT | Performed by: FAMILY MEDICINE

## 2023-05-24 ENCOUNTER — OFFICE VISIT (OUTPATIENT)
Dept: FAMILY MEDICINE CLINIC | Facility: CLINIC | Age: 63
End: 2023-05-24
Payer: COMMERCIAL

## 2023-05-24 VITALS
OXYGEN SATURATION: 97 % | HEIGHT: 64 IN | RESPIRATION RATE: 16 BRPM | BODY MASS INDEX: 24.59 KG/M2 | DIASTOLIC BLOOD PRESSURE: 74 MMHG | WEIGHT: 144 LBS | HEART RATE: 64 BPM | SYSTOLIC BLOOD PRESSURE: 126 MMHG | TEMPERATURE: 98 F

## 2023-05-24 DIAGNOSIS — G47.33 OSA (OBSTRUCTIVE SLEEP APNEA): Primary | ICD-10-CM

## 2023-05-24 PROCEDURE — 3074F SYST BP LT 130 MM HG: CPT | Performed by: NURSE PRACTITIONER

## 2023-05-24 PROCEDURE — 3078F DIAST BP <80 MM HG: CPT | Performed by: NURSE PRACTITIONER

## 2023-05-24 PROCEDURE — 99214 OFFICE O/P EST MOD 30 MIN: CPT | Performed by: NURSE PRACTITIONER

## 2023-05-24 PROCEDURE — 3008F BODY MASS INDEX DOCD: CPT | Performed by: NURSE PRACTITIONER

## 2023-06-16 RX ORDER — FLUOXETINE 10 MG/1
10 CAPSULE ORAL DAILY
Qty: 90 CAPSULE | Refills: 1 | Status: SHIPPED | OUTPATIENT
Start: 2023-06-16

## 2023-06-16 NOTE — TELEPHONE ENCOUNTER
Last appt 2/28/23 advised Return in about 1 year (around 2/28/2024).       Future Appointments   Date Time Provider Tala Chau   6/19/2023 12:30 PM Jeff Smart [facility]PC [facility] [place]   8/24/2023  9:30 AM Liborio Ruiz APRN [facility] [place]

## 2023-07-18 NOTE — TELEPHONE ENCOUNTER
Last Refill: 12/15/2021 5MG #10 with 0 Refills  Last Px: 2/28/2023    Return in about 1 year (around 2/28/2024). Future appt: Your appointments       Date & Time Appointment Department [facility])    Jul 19, 2023  2:00 PM CDT Follow up with Yuri Mahajan [address] ([facility])        Aug 24, 2023  9:30 AM CDT Sleep [facility] ([facility])              [address] [facility] [place]  [phone]  [facility]  [phone]  [phone]          Last Appointment with provider:   2/28/2023  Last appointment at [facility] [place]:  5/24/2023  Cholesterol, Total (mg/dL)   Date Value   02/28/2023 240 (H)   09/25/2019 197     HDL Cholesterol (mg/dL)   Date Value   02/28/2023 55   09/25/2019 58.9     LDL Cholesterol (mg/dL)   Date Value   02/28/2023 171 (H)   09/25/2019 126     Triglycerides (mg/dL)   Date Value   02/28/2023 80   09/25/2019 60     Lab Results   Component Value Date    A1C 5.7 09/25/2019     Lab Results   Component Value Date    T4F 1.2 12/09/2022    TSH 0.647 12/09/2022       No follow-ups on file.

## 2023-07-19 RX ORDER — DIAZEPAM 5 MG/1
5 TABLET ORAL EVERY 6 HOURS PRN
Qty: 10 TABLET | Refills: 0 | Status: SHIPPED | OUTPATIENT
Start: 2023-07-19

## 2023-09-20 ENCOUNTER — OFFICE VISIT (OUTPATIENT)
Dept: FAMILY MEDICINE CLINIC | Facility: CLINIC | Age: 63
End: 2023-09-20
Payer: COMMERCIAL

## 2023-09-20 VITALS
DIASTOLIC BLOOD PRESSURE: 72 MMHG | TEMPERATURE: 97 F | HEIGHT: 64 IN | WEIGHT: 144 LBS | HEART RATE: 74 BPM | BODY MASS INDEX: 24.59 KG/M2 | RESPIRATION RATE: 16 BRPM | OXYGEN SATURATION: 95 % | SYSTOLIC BLOOD PRESSURE: 120 MMHG

## 2023-09-20 DIAGNOSIS — G47.33 OSA (OBSTRUCTIVE SLEEP APNEA): Primary | ICD-10-CM

## 2023-09-20 PROCEDURE — 3008F BODY MASS INDEX DOCD: CPT | Performed by: NURSE PRACTITIONER

## 2023-09-20 PROCEDURE — 3074F SYST BP LT 130 MM HG: CPT | Performed by: NURSE PRACTITIONER

## 2023-09-20 PROCEDURE — 3078F DIAST BP <80 MM HG: CPT | Performed by: NURSE PRACTITIONER

## 2023-09-20 PROCEDURE — 99214 OFFICE O/P EST MOD 30 MIN: CPT | Performed by: NURSE PRACTITIONER

## (undated) NOTE — MR AVS SNAPSHOT
[address] [place]  [address] [phone]  [phone]               Thank you for choosing us for your health care visit with Olaf Wood MD.  We are glad to serve you and happy to provide you with this summary o Assoc Dx:  Preoperative examination [Z01.818], Osteoporosis [M81.0], Vitamin D deficiency [E55.9], Depression, unspecified depression type [F32.9], Mucoid cyst of joint [M67.40], Anxiety state [F41.1], Chronic gum disease [K06.9]                 Reason fo [facility] will allow you to access patient instructions from your recent visit,  view other health information, and more. To sign up or find more information, go to [email or web address] and click on the Sign Up Now link in the [employer] box.      Enter

## (undated) NOTE — MR AVS SNAPSHOT
[address] [place]  [address] [phone]  [phone]               Thank you for choosing us for your health care visit with Marnee Dubin, MD.  We are glad to serve you and happy to provide you with this summary Commonly known as:  FLAGYL           Vitamin D3 41895 units Caps   Take 50,000 Units by mouth.                    Results of Recent Testing     STREP A ASSAY W/OPTIC      Component Value Standard Range & Units    STREP GRP A CUL-SCR negative Negative    Con